# Patient Record
Sex: MALE | Race: WHITE
[De-identification: names, ages, dates, MRNs, and addresses within clinical notes are randomized per-mention and may not be internally consistent; named-entity substitution may affect disease eponyms.]

---

## 2019-02-13 ENCOUNTER — HOSPITAL ENCOUNTER (EMERGENCY)
Dept: HOSPITAL 26 - MED | Age: 24
Discharge: HOME | End: 2019-02-13
Payer: MEDICAID

## 2019-02-13 VITALS — WEIGHT: 160 LBS | BODY MASS INDEX: 21.67 KG/M2 | HEIGHT: 72 IN

## 2019-02-13 VITALS — DIASTOLIC BLOOD PRESSURE: 56 MMHG | SYSTOLIC BLOOD PRESSURE: 109 MMHG

## 2019-02-13 DIAGNOSIS — H81.13: Primary | ICD-10-CM

## 2019-02-13 NOTE — NUR
Patient discharged with v/s stable. Written and verbal after care instructions 
given and explained. 

Patient alert, oriented and verbalized understanding of instructions. 
Ambulatory with steady gait. All questions addressed prior to discharge. ID 
band removed. Patient advised to follow up with PMD. Rx of ANTIVERT WAS given. 
Patient educated on indication of medication including possible reaction and 
side effects. Opportunity to ask questions provided and answered.

PT WAS D/C BY DR. MORENO

## 2019-02-13 NOTE — NUR
PATIENT PRESENTS TO ER WITH C/O LIGHT HEADEDNESS AND PRESSURE IN EARS BILATERAL 
X 1 DAY. PT DENIES N/V/D, COUGH OR CONGESTION. PT HAS NO PAIN; PATIENT STATES 
PAIN OF 0/10 AT THIS TIME;PT IS A/O X4. VSS; PATIENT POSITIONED FOR COMFORT; 
HOB ELEVATED; BEDRAILS UP X2; BED DOWN. ER MD MADE AWARE OF PT STATUS.

KNA AND NO PREVIOUS MED. HX

## 2019-02-16 ENCOUNTER — HOSPITAL ENCOUNTER (EMERGENCY)
Dept: HOSPITAL 26 - MED | Age: 24
Discharge: HOME | End: 2019-02-16
Payer: MEDICAID

## 2019-02-16 VITALS — SYSTOLIC BLOOD PRESSURE: 128 MMHG | DIASTOLIC BLOOD PRESSURE: 85 MMHG

## 2019-02-16 VITALS — WEIGHT: 169.25 LBS | HEIGHT: 72 IN | BODY MASS INDEX: 22.92 KG/M2

## 2019-02-16 VITALS — DIASTOLIC BLOOD PRESSURE: 71 MMHG | SYSTOLIC BLOOD PRESSURE: 121 MMHG

## 2019-02-16 DIAGNOSIS — R10.9: Primary | ICD-10-CM

## 2019-02-16 NOTE — NUR
Patient discharged with v/s stable. Written and verbal after care instructions 
given and explained. 

Patient alert, oriented and verbalized understanding of instructions. 
Ambulatory with steady gait. All questions addressed prior to discharge. ID 
band removed. Patient advised to follow up with PMD. Rx of SENOKOT 8.6MG AND 
PROTONIX 40MG given. Patient educated on indication of medication including 
possible reaction and side effects. Opportunity to ask questions provided and 
answered.

## 2019-02-16 NOTE — NUR
23Y/M BIB SELF C/O "BLOATED" SENSATION, LOWER ABDOMINAL DISCOMFORT X 1 WK, BAD 
TASTE IN MOUTH, PT STATES HE FEELS FULL ALL THE TIME, PT LAST BM THIS MORNING, 
NO STRAINING INVOLVEMENT, PT DENIES EMESIS OR DIARRHEA. PT AAOX4, VSS AT THIS 
TIME, AB SOFT, NON TENDER, ACTIVE BS X 4 QUAD, BED DOWN, BEDRAIL UP X 1, ER MD 
AWARE AND NOTIIFIED OF PT STATUS. 



PMH:NONE

RX: NONE

## 2019-03-10 ENCOUNTER — HOSPITAL ENCOUNTER (EMERGENCY)
Dept: HOSPITAL 26 - MED | Age: 24
Discharge: HOME | End: 2019-03-10
Payer: MEDICAID

## 2019-03-10 VITALS — WEIGHT: 167.13 LBS | HEIGHT: 72 IN | BODY MASS INDEX: 22.64 KG/M2

## 2019-03-10 VITALS — DIASTOLIC BLOOD PRESSURE: 82 MMHG | SYSTOLIC BLOOD PRESSURE: 132 MMHG

## 2019-03-10 VITALS — HEIGHT: 72 IN | WEIGHT: 160 LBS | BODY MASS INDEX: 21.67 KG/M2

## 2019-03-10 VITALS — DIASTOLIC BLOOD PRESSURE: 55 MMHG | SYSTOLIC BLOOD PRESSURE: 103 MMHG

## 2019-03-10 VITALS — DIASTOLIC BLOOD PRESSURE: 67 MMHG | SYSTOLIC BLOOD PRESSURE: 113 MMHG

## 2019-03-10 VITALS — SYSTOLIC BLOOD PRESSURE: 108 MMHG | DIASTOLIC BLOOD PRESSURE: 66 MMHG

## 2019-03-10 DIAGNOSIS — Z79.899: ICD-10-CM

## 2019-03-10 DIAGNOSIS — R14.3: Primary | ICD-10-CM

## 2019-03-10 DIAGNOSIS — K59.00: Primary | ICD-10-CM

## 2019-03-10 DIAGNOSIS — R14.3: ICD-10-CM

## 2019-03-10 LAB
ALBUMIN FLD-MCNC: 4.2 G/DL (ref 3.4–5)
AMYLASE SERPL-CCNC: 40 U/L (ref 25–115)
ANION GAP SERPL CALCULATED.3IONS-SCNC: 8.2 MMOL/L (ref 8–16)
APAP SERPL-MCNC: < 0.5 UG/ML (ref 10–30)
APPEARANCE UR: CLEAR
AST SERPL-CCNC: 29 U/L (ref 15–37)
BARBITURATES UR QL SCN: (no result) NG/ML
BASOPHILS # BLD AUTO: 0 K/UL (ref 0–0.22)
BASOPHILS NFR BLD AUTO: 0.7 % (ref 0–2)
BENZODIAZ UR QL SCN: (no result) NG/ML
BILIRUB SERPL-MCNC: 0.7 MG/DL (ref 0–1)
BILIRUB UR QL STRIP: NEGATIVE
BUN SERPL-MCNC: 11 MG/DL (ref 7–18)
BZE UR QL SCN: (no result) NG/ML
CANNABINOIDS UR QL SCN: (no result) NG/ML
CHLORIDE SERPL-SCNC: 103 MMOL/L (ref 98–107)
CO2 SERPL-SCNC: 31.3 MMOL/L (ref 21–32)
COLOR UR: YELLOW
CREAT SERPL-MCNC: 0.9 MG/DL (ref 0.7–1.3)
EOSINOPHIL # BLD AUTO: 0.1 K/UL (ref 0–0.4)
EOSINOPHIL NFR BLD AUTO: 2.4 % (ref 0–4)
ERYTHROCYTE [DISTWIDTH] IN BLOOD BY AUTOMATED COUNT: 12.9 % (ref 11.6–13.7)
GFR SERPL CREATININE-BSD FRML MDRD: 132 ML/MIN (ref 90–?)
GLUCOSE SERPL-MCNC: 83 MG/DL (ref 74–106)
GLUCOSE UR STRIP-MCNC: NEGATIVE MG/DL
HCT VFR BLD AUTO: 45.6 % (ref 36–52)
HGB BLD-MCNC: 15 G/DL (ref 12–18)
HGB UR QL STRIP: NEGATIVE
LEUKOCYTE ESTERASE UR QL STRIP: NEGATIVE
LIPASE SERPL-CCNC: 89 U/L (ref 73–393)
LYMPHOCYTES # BLD AUTO: 1.2 K/UL (ref 2–11.5)
LYMPHOCYTES NFR BLD AUTO: 35.4 % (ref 20.5–51.1)
MCH RBC QN AUTO: 27 PG (ref 27–31)
MCHC RBC AUTO-ENTMCNC: 33 G/DL (ref 33–37)
MCV RBC AUTO: 82.2 FL (ref 80–94)
MONOCYTES # BLD AUTO: 0.4 K/UL (ref 0.8–1)
MONOCYTES NFR BLD AUTO: 10.6 % (ref 1.7–9.3)
NEUTROPHILS # BLD AUTO: 1.7 K/UL (ref 1.8–7.7)
NEUTROPHILS NFR BLD AUTO: 50.9 % (ref 42.2–75.2)
NITRITE UR QL STRIP: NEGATIVE
OPIATES UR QL SCN: (no result) NG/ML
PCP UR QL SCN: (no result) NG/ML
PH UR STRIP: 6.5 [PH] (ref 5–9)
PLATELET # BLD AUTO: 134 K/UL (ref 140–450)
POTASSIUM SERPL-SCNC: 3.5 MMOL/L (ref 3.5–5.1)
RBC # BLD AUTO: 5.56 MIL/UL (ref 4.2–6.1)
SALICYLATES SERPL-MCNC: < 2.8 MG/DL (ref 2.8–20)
SODIUM SERPL-SCNC: 139 MMOL/L (ref 136–145)
WBC # BLD AUTO: 3.4 K/UL (ref 4.8–10.8)

## 2019-03-10 PROCEDURE — G0480 DRUG TEST DEF 1-7 CLASSES: HCPCS

## 2019-03-10 PROCEDURE — 83690 ASSAY OF LIPASE: CPT

## 2019-03-10 PROCEDURE — 81003 URINALYSIS AUTO W/O SCOPE: CPT

## 2019-03-10 PROCEDURE — 96372 THER/PROPH/DIAG INJ SC/IM: CPT

## 2019-03-10 PROCEDURE — 82150 ASSAY OF AMYLASE: CPT

## 2019-03-10 PROCEDURE — 36415 COLL VENOUS BLD VENIPUNCTURE: CPT

## 2019-03-10 PROCEDURE — 99284 EMERGENCY DEPT VISIT MOD MDM: CPT

## 2019-03-10 PROCEDURE — G0482 DRUG TEST DEF 15-21 CLASSES: HCPCS

## 2019-03-10 PROCEDURE — 80053 COMPREHEN METABOLIC PANEL: CPT

## 2019-03-10 PROCEDURE — 80305 DRUG TEST PRSMV DIR OPT OBS: CPT

## 2019-03-10 PROCEDURE — 85025 COMPLETE CBC W/AUTO DIFF WBC: CPT

## 2019-03-10 PROCEDURE — 74177 CT ABD & PELVIS W/CONTRAST: CPT

## 2019-03-10 NOTE — NUR
BIB FAMILY C/O  INTERMITENT MID-LINE ABD PAIN X 3 MTHS. PT WAS SEEN THIS AM AT 
[facility] FOR SAME S/S.  DENIES N/V/D. PATIENT STATES PAIN OF 7/10 AT THIS TIME. 
PATIENT POSITIONED FOR COMFORT; HOB ELEVATED; BEDRAILS UP X2; BED DOWN. ER MD 
MADE AWARE OF PT STATUS.

## 2019-03-10 NOTE — NUR
22 YO MALE COMES TO ER FOR C/O ABD PAIN. PT STATES HE HAS HAD THIS PAIN X3 
WEEKS. PT STATES HE WAS IN ER FOR SAME ISSUE X1 WEEK AND STATED "I WAS 
DISCHARGED WITH MEDICATION FOR PAIN AND MEDICATION TO GO TO BATHROOM." PT HAS 
NO PRIOR MEDICAL HX. OR ALLERGIES. PT AAOX4 SITTING UP IN BED. LUNGS CLEAR EVEN 
UNLABORED. ACTIVE BOWEL SOUNDS, LAST BM NOTED TODAY THIS AM. SKIN WARM DRY 
INTACT. VINOD LOCKED AND IN LOWEST POSITION. WILL UPDATE ER MD. WILL CONTINUE 
TO MONITOR.

## 2019-03-10 NOTE — NUR
Patient discharged with v/s stable. Written and verbal after care instructions 
given and explained. Patient alert, oriented and verbalized understanding of 
instructions. Ambulatory with steady gait. All questions addressed prior to 
discharge. ID band removed. Patient advised to follow up with PMD. Rx of REGLAN 
given. Patient educated on indication of medication including possible reaction 
and side effects. Opportunity to ask questions provided and answered.

## 2019-03-10 NOTE — NUR
Patient discharged with v/s stable. Written and verbal after care instructions 
given and explained. 

Patient alert, oriented and verbalized understanding of instructions. 
Ambulatory with steady gait. All questions addressed prior to discharge. ID 
band removed. Patient advised to follow up with PMD. Rx of COLACE given. 
Patient educated on indication of medication including possible reaction and 
side effects. Opportunity to ask questions provided and answered.